# Patient Record
Sex: FEMALE | Race: WHITE | Employment: PART TIME | URBAN - METROPOLITAN AREA
[De-identification: names, ages, dates, MRNs, and addresses within clinical notes are randomized per-mention and may not be internally consistent; named-entity substitution may affect disease eponyms.]

---

## 2017-05-08 ENCOUNTER — THERAPY VISIT (OUTPATIENT)
Dept: PHYSICAL THERAPY | Facility: CLINIC | Age: 42
End: 2017-05-08
Payer: COMMERCIAL

## 2017-05-08 DIAGNOSIS — M25.551 HIP PAIN, RIGHT: ICD-10-CM

## 2017-05-08 DIAGNOSIS — M62.81 GENERALIZED MUSCLE WEAKNESS: ICD-10-CM

## 2017-05-08 DIAGNOSIS — N39.3 FEMALE STRESS INCONTINENCE: Primary | ICD-10-CM

## 2017-05-08 PROCEDURE — 97140 MANUAL THERAPY 1/> REGIONS: CPT | Mod: GP | Performed by: PHYSICAL THERAPIST

## 2017-05-08 PROCEDURE — 97535 SELF CARE MNGMENT TRAINING: CPT | Mod: GP | Performed by: PHYSICAL THERAPIST

## 2017-05-08 PROCEDURE — 97161 PT EVAL LOW COMPLEX 20 MIN: CPT | Mod: GP | Performed by: PHYSICAL THERAPIST

## 2017-05-08 PROCEDURE — 97110 THERAPEUTIC EXERCISES: CPT | Mod: GP | Performed by: PHYSICAL THERAPIST

## 2017-05-08 NOTE — MR AVS SNAPSHOT
After Visit Summary   5/8/2017    Adelina Houston    MRN: [id number]           Patient Information     Date Of Birth          1975        Visit Information        Provider Department      5/8/2017 2:10 PM Hilligoss, Amanda K, PT [facility]        Today's Diagnoses     Female stress incontinence    -  1    Hip pain, right           Follow-ups after your visit        Your next 10 appointments already scheduled     May 18, 2017 11:40 AM CDT   WILLIE For Women Only with Amanda K Hilligoss, PT   [facility] ([facility]  )    [address]  [facility] [address]   [phone]            May 25, 2017 11:40 AM CDT   WILLIE For Women Only with Amanda K Hilligoss, PT   [facility] ([facility]  )    [address]  [facility] [id number] [phone]              Who to contact     If you have questions or need follow up information about today's clinic visit or your schedule please contact [facility] directly at [phone].  Normal or non-critical lab and imaging results will be communicated to you by [employer] Talk[employer], letter or phone within 4 business days after the clinic has received the results. If you do not hear from us within 7 days, please contact the clinic through [employer] Talk[employer] or phone. If you have a critical or abnormal lab result, we will notify you by phone as soon as possible.  Submit refill requests through [employer] or call your pharmacy and they will forward the refill request to us. Please allow 3 business days for your refill to be completed.          Additional Information About Your Visit        [email or web address] Information     [employer] lets you send messages to your doctor, view your test results, renew your prescriptions, schedule appointments and more. To sign up, go to  [email or web address] . Click on \"Log in\" on the left side of the screen, which will take you to the Welcome page. Then click on \"Sign up Now\" on the right side of the page.     You will be asked to enter the access code listed below, as well as some personal information. Please follow the directions to create your username and password.     Your access code is: [email or web address]  Expires: 2017  4:01 PM     Your access code will  in 90 days. If you need help or a new code, please call your [facility] clinic or [phone].        Care EveryWhere ID     This is your Care EveryWhere ID. This could be used by other organizations to access your [facility] medical records  [id number]         Blood Pressure from Last 3 Encounters:   02 112/60    Weight from Last 3 Encounters:   02 56.6 kg (124 lb 12 oz)              We Performed the Following     HC PT EVAL, LOW COMPLEXITY     WILLIE INITIAL EVAL REPORT     MANUAL THER TECH,1+REGIONS,EA 15 MIN     SELF CARE MNGMENT TRAINING     THERAPEUTIC EXERCISES        Primary Care Provider Office Phone # Fax #    Ayana Knowles [phone]       XXX  XXX XXX  XXX [place] [address]        Thank you!     Thank you for choosing [facility]  for your care. Our goal is always to provide you with excellent care. Hearing back from our patients is one way we can continue to improve our services. Please take a few minutes to complete the written survey that you may receive in the mail after your visit with us. Thank you!             Your Updated Medication List - Protect others around you: Learn how to safely use, store and throw away your medicines at www.disposemymeds.org.          This list is accurate as of: 17  4:01 PM.  Always use your most recent med list.                   Brand Name Dispense Instructions for use    PRENATE ADVANCE Tabs     30    1 TABLET DAILY       ZYRTEC 10 MG tablet "   Generic drug:  cetirizine          1 TABLET DAILY

## 2017-05-08 NOTE — PROGRESS NOTES
[facility] Initial Evaluation  Subjective:  Adelina Houston is a 41 year old year old female with a pelvic floor condition. Patient reports onset of symptoms 2-3 years ago (after birth of 3rd child). Saw MD April 2017.Symptoms include stress incontinence. Since onset symptoms have been getting worse.  Urination:  Do you leak on the way to the bathroom or with a strong urge to void? No   Do you leak with cough,sneeze, jumping, running?Yes, jumping/ running   Any other activities that cause leaking? No   Do you have triggers that make you feel you can't wait to go to the bathroom? No   Type of pad and number used per day?  1 if doing higher level activities  When you leak what is the amount? small  How long can you delay the need to urinate? 1-2 hours.   How many times do you get up to urinate at night? 1   Can you stop the flow of urine when on the toilet? No (not tried)  Is the volume of urine passed usually: average. (8sec rule= 250ml with average bladder storing 400-600ml)  Do you strain to pass urine? No  Do you have a slow or hesitant urinary stream? No  Do you have difficulty initiating the urine stream? No  Is urination painful? No  How many bladder infections have you had in last 12 months?0  Fluid intake(one glass is 8oz or one cup) 2 glasses/day, 8 oz caffinated glasses/day  0 alcohol glasses/day.  Bowel habits:  Frequency of bowel movements? 1 times/day  Consistancy of stool? soft formed  Do you ignore the urge to defecate? No  Do you strain to pass stool? No  Pelvic Pain:  Do you have any pelvic pain with intercourse, exams, use of tampons? No  Is initial penetration during intercourse painful? No  Is deeper penetration painful? No  Do you use lubricant? No   ?  Given birth? Yes   Any complications? No  # of vaginal delieveries?3  # of C-sections?0  # of episiotomies?0.  Are you sexually active?Yes  Have you ever been worried for your physical safety? No  Any abdominal or pelvic  surgeries? No  Are you having any regular exercise? 30+ minutes daily  Have you practiced the PF(kegel) exercises for 4 or more weeks? No  Thyroid checked? No   (related to hair loss, flu-like symptoms, wt gain/loss, fatigue, menopause)  Changed diet lately? No  Patient is a 41 year old female presenting with rehab right hip hpi. The history is provided by the patient.   Adelina Houston is a 41 year old female with a right hip condition.  Condition occurred with:  Insidious onset.  Condition occurred: for unknown reasons.  This is a chronic condition  Jan 2017, started increasing frequency of exercising.    Patient reports pain:  Lateral.  Radiates to:  Low back.  Pain is described as aching and is intermittent and reported as 4/10.   Pain is the same all the time.  Symptoms are exacerbated by sitting and relieved by activity/movement.  Since onset symptoms are gradually improving.    Previous treatment includes chiropractic.  There was no improvement following previous treatment.  General health as reported by patient is good.    Medical allergies: no.  Other surgeries include:  None reported.  Current medications:  None as reported by the patient.  Current occupation is [employer].  Patient is working in normal job without restrictions.  Primary job tasks include:  Prolonged standing and lifting.    Barriers include:  None as reported by the patient.    Red flags:  None as reported by the patient.                        Objective:  Pt verbally agrees to internal assessment of pelvic floor muscles.  Baseline PF tone: WNL  PF Tone with cough: bulge  Valsalva: bulge  PF Response quality: moderate  PF Power: Center: 2  Endurance: Maximum contraction in seconds: 3  # of endurance contractions before fatigue: 10  Quick contraction repetitions prior to fatigue: Not tested.  Specificity/accessory muscles: initially uses glutes; uses adductors when cued not to use glutes  Prolapse: Cyctocele/Rectocele/Uterine grade:  0  Urethrocele: none, Enterocele: none.  PALPATION: Non-tender all superficial structures with digital evaluation  BIOFEEDBACK:  Tested in supine (improved resting tone compared to hooklying):Good initial contraction w/ quick fatigue. Good return to baseline. Consistent initial contraction for all 10 reps.    FUNCTIONAL QUESTIONNAIRES:  IIQ: 3/21 (Physical Recreation primary concern)  FAISAL: 1/18  AUA 1/35      Standing Alignment:        Lumbar:  Anterior pelvic tilt      Knee deviations alignment: Stands w/ knees locked.          Flexibility/Screens:       Lower Extremity:  Decreased left lower extremity flexibility:Piriformis    Decreased right lower extremity flexibility:  Piriformis and Hip Flexors               Lumbar/SI Evaluation  ROM:    AROM Lumbar:   Flexion:            WNL  Ext:                    WNL   Side Bend:        Left:  WNL    Right:  WNL  Rotation:           Left:  WNL    Right:  WNL  Side Glide:        Left:     Right:                             SI joint/Sacrum:    (+) Active SLR L  ASIS low on R/ high on L  (-) Torsion  Hip Adbd 5/5 B  General hypertonic R hip, no pain w/ palpation.    After MET:   (-) Active SLR  (-) ASIS level in supine                                                       General     ROS    Assessment/Plan:      Patient is a 41 year old female with pelvic and right side hip complaints.    Patient has the following significant findings with corresponding treatment plan.                Diagnosis 1:  Stress incontinence  Decreased strength - therapeutic exercise, therapeutic activities and home program  Impaired muscle performance - neuro re-education and home program  Decreased function - therapeutic activities and home program  Diagnosis 2:  R Hip pain   Pain -  hot/cold therapy, electric stimulation, manual therapy, self management, education and home program  Decreased ROM/flexibility - manual therapy, therapeutic exercise, therapeutic activity and home program  Decreased  joint mobility - manual therapy, therapeutic exercise, therapeutic activity and home program  Decreased strength - therapeutic exercise, therapeutic activities and home program  Impaired muscle performance - neuro re-education and home program  Impaired posture - neuro re-education, therapeutic activities and home program    Therapy Evaluation Codes:   1) History comprised of:   Personal factors that impact the plan of care:      Age, Gender and Profession.    Comorbidity factors that impact the plan of care are:      Bowel/bladder changes.     Medications impacting care: None.  2) Examination of Body Systems comprised of:   Body structures and functions that impact the plan of care:      Hip and Pelvis.   Activity limitations that impact the plan of care are:      Driving, Jumping, Sitting and Stress incontinence.  3) Clinical presentation characteristics are:   Stable/Uncomplicated.  4) Decision-Making    Low complexity using standardized patient assessment instrument and/or measureable assessment of functional outcome.  Cumulative Therapy Evaluation is: Low complexity.    Previous and current functional limitations:  (See Goal Flow Sheet for this information)    Short term and Long term goals: (See Goal Flow Sheet for this information)     Communication ability:  Patient appears to be able to clearly communicate and understand verbal and written communication and follow directions correctly.  Treatment Explanation - The following has been discussed with the patient:   RX ordered/plan of care  Anticipated outcomes  Possible risks and side effects  This patient would benefit from PT intervention to resume normal activities.   Rehab potential is good.    Frequency:  1 X week, once daily  Duration:  for 6 weeks  Discharge Plan:  Achieve all LTG.  Independent in home treatment program.  Reach maximal therapeutic benefit.    Please refer to the daily flowsheet for treatment today, total treatment time and time spent  performing 1:1 timed codes.

## 2017-05-08 NOTE — LETTER
[facility]  [address]  [facility] [id number] [phone]    May 9, 2017    Re: Adelina Houston   :   1975  MRN:  [id number]   REFERRING PHYSICIAN:   Nina Foote    [facility]  Date of Initial Evaluation:  17  Visits:  Rxs Used: 1  Reason for Referral:     Female stress incontinence  Hip pain, right    EVALUATION SUMMARY    [facility]  Subjective:  Adelina Houston is a 41 year old year old female with a pelvic floor condition. Patient reports onset of symptoms 2-3 years ago (after birth of 3rd child). Saw MD 2017.Symptoms include stress incontinence. Since onset symptoms have been getting worse.  Urination:  Do you leak on the way to the bathroom or with a strong urge to void? No   Do you leak with cough,sneeze, jumping, running?Yes, jumping/ running   Any other activities that cause leaking? No   Do you have triggers that make you feel you can't wait to go to the bathroom? No   Type of pad and number used per day?  1 if doing higher level activities  When you leak what is the amount? small  How long can you delay the need to urinate? 1-2 hours.   How many times do you get up to urinate at night? 1   Can you stop the flow of urine when on the toilet? No (not tried)  Is the volume of urine passed usually: average. (8sec rule= 250ml with average bladder storing 400-600ml)  Do you strain to pass urine? No  Do you have a slow or hesitant urinary stream? No  Do you have difficulty initiating the urine stream? No  Is urination painful? No  How many bladder infections have you had in last 12 months?0  Fluid intake(one glass is 8oz or one cup) 2 glasses/day, 8 oz caffinated glasses/day  0 alcohol glasses/day.    Bowel habits:  Frequency of bowel movements? 1 times/day  Consistancy of stool? soft formed  Do you ignore the urge to defecate? No  Do you  strain to pass stool? No  Pelvic Pain:  Do you have any pelvic pain with intercourse, exams, use of tampons? No  Is initial penetration during intercourse painful? No  Is deeper penetration painful? No  Do you use lubricant? No   ?  Given birth? Yes   Any complications? No  # of vaginal delieveries?3  # of C-sections?0  # of episiotomies?0.  Are you sexually active?Yes  Have you ever been worried for your physical safety? No  Any abdominal or pelvic surgeries? No  Are you having any regular exercise? 30+ minutes daily  Have you practiced the PF(kegel) exercises for 4 or more weeks? No  Thyroid checked? No   (related to hair loss, flu-like symptoms, wt gain/loss, fatigue, menopause)  Changed diet lately? No  Patient is a 41 year old female presenting with rehab right hip hpi. The history is provided by the patient.   Adelina Houston is a 41 year old female with a right hip condition.  Condition occurred with:  Insidious onset.  Condition occurred: for unknown reasons.  This is a chronic condition  Jan 2017, started increasing frequency of exercising.    Patient reports pain:  Lateral.  Radiates to:  Low back.  Pain is described as aching and is intermittent and reported as 4/10.   Pain is the same all the time.  Symptoms are exacerbated by sitting and relieved by activity/movement.  Since onset symptoms are gradually improving.    Previous treatment includes chiropractic.  There was no improvement following previous treatment.  General health as reported by patient is good.    Medical allergies: no.  Other surgeries include:  None reported.  Current medications:  None as reported by the patient.  Current occupation is [employer].  Patient is working in normal job without restrictions.  Primary job tasks include:  Prolonged standing and lifting.    Barriers include:  None as reported by the patient.  Red flags:  None as reported by the patient.        Objective:  Pt verbally agrees to internal assessment of  pelvic floor muscles.  Baseline PF tone: WNL  PF Tone with cough: bulge  Valsalva: bulge  PF Response quality: moderate  PF Power: Center: 2  Endurance: Maximum contraction in seconds: 3  # of endurance contractions before fatigue: 10  Quick contraction repetitions prior to fatigue: Not tested.  Specificity/accessory muscles: initially uses glutes; uses adductors when cued not to use glutes  Prolapse: Cyctocele/Rectocele/Uterine stgstrstastdstest:st st1st Urethrocele: none, Enterocele: none.  PALPATION: Non-tender all superficial structures with digital evaluation  BIOFEEDBACK:  Tested in supine (improved resting tone compared to hooklying):Good initial contraction w/ quick fatigue. Good return to baseline. Consistent initial contraction for all 10 reps.    FUNCTIONAL QUESTIONNAIRES:  IIQ: 3/21 (Physical Recreation primary concern)  FAISAL: 1/18  AUA 1/35    Standing Alignment:    Lumbar:  Anterior pelvic tilt  Knee deviations alignment: Stands w/ knees locked.    Flexibility/Screens:   Lower Extremity:  Decreased left lower extremity flexibility:Piriformis  Decreased right lower extremity flexibility:  Piriformis and Hip Flexors    Lumbar/SI Evaluation  ROM:    AROM Lumbar:   Flexion:            WNL  Ext:                    WNL   Side Bend:        Left:  WNL    Right:  WNL  Rotation:           Left:  WNL    Right:  WNL  Side Glide:        Left:     Right:        SI joint/Sacrum:    (+) Active SLR L  ASIS low on R/ high on L  (-) Torsion  Hip Adbd 5/5 B  General hypertonic R hip, no pain w/ palpation.  After MET:   (-) Active SLR  (-) ASIS level in supine                                Assessment/Plan:      Patient is a 41 year old female with pelvic and right side hip complaints.    Patient has the following significant findings with corresponding treatment plan.                Diagnosis 1:  Stress incontinence  Decreased strength - therapeutic exercise, therapeutic activities and home program  Impaired muscle performance - neuro  re-education and home program  Decreased function - therapeutic activities and home program  Diagnosis 2:  R Hip pain   Pain -  hot/cold therapy, electric stimulation, manual therapy, self management, education and home program  Decreased ROM/flexibility - manual therapy, therapeutic exercise, therapeutic activity and home program  Decreased joint mobility - manual therapy, therapeutic exercise, therapeutic activity and home program  Decreased strength - therapeutic exercise, therapeutic activities and home program  Impaired muscle performance - neuro re-education and home program  Impaired posture - neuro re-education, therapeutic activities and home program    Therapy Evaluation Codes:   1) History comprised of:   Personal factors that impact the plan of care:      Age, Gender and Profession.    Comorbidity factors that impact the plan of care are:      Bowel/bladder changes.     Medications impacting care: None.  2) Examination of Body Systems comprised of:   Body structures and functions that impact the plan of care:      Hip and Pelvis.   Activity limitations that impact the plan of care are:      Driving, Jumping, Sitting and Stress incontinence.  3) Clinical presentation characteristics are:   Stable/Uncomplicated.  4) Decision-Making    Low complexity using standardized patient assessment instrument and/or measureable assessment of functional outcome.  Cumulative Therapy Evaluation is: Low complexity.    Previous and current functional limitations:  (See Goal Flow Sheet for this information)    Short term and Long term goals: (See Goal Flow Sheet for this information)     Communication ability:  Patient appears to be able to clearly communicate and understand verbal and written communication and follow directions correctly.  Treatment Explanation - The following has been discussed with the patient:   RX ordered/plan of care  Anticipated outcomes  Possible risks and side effects  This patient would benefit  from PT intervention to resume normal activities.   Rehab potential is good.      Frequency:  1 X week, once daily  Duration:  for 6 weeks  Discharge Plan:  Achieve all LTG.  Independent in home treatment program.  Reach maximal therapeutic benefit.      Thank you for your referral.    INQUIRIES  Therapist: Amanda Hilligoss DPT   [facility]  [address] [place] [address] [address]  [facility] [address]  Phone: [phone]  Fax: [phone]

## 2017-05-11 PROBLEM — M62.81 GENERALIZED MUSCLE WEAKNESS: Status: ACTIVE | Noted: 2017-05-11

## 2017-05-18 ENCOUNTER — THERAPY VISIT (OUTPATIENT)
Dept: PHYSICAL THERAPY | Facility: CLINIC | Age: 42
End: 2017-05-18
Payer: COMMERCIAL

## 2017-05-18 DIAGNOSIS — N39.3 FEMALE STRESS INCONTINENCE: ICD-10-CM

## 2017-05-18 DIAGNOSIS — M62.81 GENERALIZED MUSCLE WEAKNESS: ICD-10-CM

## 2017-05-18 DIAGNOSIS — M25.551 HIP PAIN, RIGHT: ICD-10-CM

## 2017-05-18 PROCEDURE — 97112 NEUROMUSCULAR REEDUCATION: CPT | Mod: GP | Performed by: PHYSICAL THERAPIST

## 2017-05-18 PROCEDURE — 97110 THERAPEUTIC EXERCISES: CPT | Mod: GP | Performed by: PHYSICAL THERAPIST

## 2017-06-01 ENCOUNTER — THERAPY VISIT (OUTPATIENT)
Dept: PHYSICAL THERAPY | Facility: CLINIC | Age: 42
End: 2017-06-01
Payer: COMMERCIAL

## 2017-06-01 DIAGNOSIS — N39.3 FEMALE STRESS INCONTINENCE: ICD-10-CM

## 2017-06-01 DIAGNOSIS — M25.551 HIP PAIN, RIGHT: ICD-10-CM

## 2017-06-01 DIAGNOSIS — M62.81 GENERALIZED MUSCLE WEAKNESS: ICD-10-CM

## 2017-06-01 PROCEDURE — 97110 THERAPEUTIC EXERCISES: CPT | Mod: GP | Performed by: PHYSICAL THERAPIST

## 2017-06-01 PROCEDURE — 97112 NEUROMUSCULAR REEDUCATION: CPT | Mod: GP | Performed by: PHYSICAL THERAPIST

## 2017-06-01 NOTE — MR AVS SNAPSHOT
"              After Visit Summary   2017    Adelina Houston    MRN: [id number]           Patient Information     Date Of Birth          1975        Visit Information        Provider Department      2017 5:10 PM Hilligoss, Amanda K, PT [facility] [employer] [facility]        Today's Diagnoses     Generalized muscle weakness        Female stress incontinence        Hip pain, right           Follow-ups after your visit        Who to contact     If you have questions or need follow up information about today's clinic visit or your schedule please contact [facility] [employer] [facility] directly at [phone].  Normal or non-critical lab and imaging results will be communicated to you by [employer], letter or phone within 4 business days after the clinic has received the results. If you do not hear from us within 7 days, please contact the clinic through [employer] or phone. If you have a critical or abnormal lab result, we will notify you by phone as soon as possible.  Submit refill requests through [employer] or call your pharmacy and they will forward the refill request to us. Please allow 3 business days for your refill to be completed.          Additional Information About Your Visit        MyChart Information     [employer] lets you send messages to your doctor, view your test results, renew your prescriptions, schedule appointments and more. To sign up, go to [email or web address] . Click on \"Log in\" on the left side of the screen, which will take you to the Welcome page. Then click on \"Sign up Now\" on the right side of the page.     You will be asked to enter the access code listed below, as well as some personal information. Please follow the directions to create your username and password.     Your access code is: [email or web address]  Expires: 2017  4:01 PM     Your access code will  in 90 days. If you need help or a new code, please call your " [facility] or [phone].        Care EveryWhere ID     This is your Care EveryWhere ID. This could be used by other organizations to access your [place] medical records  [id number]         Blood Pressure from Last 3 Encounters:   02 112/60    Weight from Last 3 Encounters:   02 56.6 kg (124 lb 12 oz)              We Performed the Following     [facility] PROGRESS NOTES REPORT     NEUROMUSCULAR RE-EDUCATION     THERAPEUTIC EXERCISES        Primary Care Provider Office Phone # Fax Td aMrcosinna Lindy Knowles [phone]       XXX  XXX XXX  XXX [place] [address]        Thank you!     Thank you for choosing [facility]  for your care. Our goal is always to provide you with excellent care. Hearing back from our patients is one way we can continue to improve our services. Please take a few minutes to complete the written survey that you may receive in the mail after your visit with us. Thank you!             Your Updated Medication List - Protect others around you: Learn how to safely use, store and throw away your medicines at www.disposemymeds.org.          This list is accurate as of: 17  5:49 PM.  Always use your most recent med list.                   Brand Name Dispense Instructions for use    PRENATE ADVANCE Tabs     30    1 TABLET DAILY       ZYRTEC 10 MG tablet   Generic drug:  cetirizine          1 TABLET DAILY

## 2017-06-01 NOTE — LETTER
[facility]  [address]  [facility] [id number] [phone]    2017    Re: Adelina Houston   :   1975  MRN:  [id number]   REFERRING PHYSICIAN:   Nina Foote    [facility]  Date of Initial Evaluation:  17  Visits:  Rxs Used: 3  Reason for Referral:     Generalized muscle weakness  Female stress incontinence  Hip pain, right    DISCHARGE REPORT  Progress reporting period is from 17 to 17.       SUBJECTIVE  Pt notes she no longer has pain in R hip, starting about a week ago. Has not had any episodes of incontinence over past week, even with jumping. Feels she is ready to continue progressing independently w/ HEP.    Current Pain level: 0/10.     Initial Pain level: 4/10.   Changes in function:  Yes (See Goal flowsheet attached for changes in current functional level)  Adverse reaction to treatment or activity: None    OBJECTIVE  Continues to stand w/ increased lumbar lordosis; (+) Hai test B, (-) Gillet test, (-) Active SLR, (-) ROLAND B; Pt deferred internal pelvic floor muscle assessment due to 0 episodes of incontinence over past week; Plank Advanced: 35 sec; IIQ  (initially 3/21)     ASSESSMENT/PLAN  Updated problem list and treatment plan: Diagnosis 1:  Stress incontinence  Decreased strength - home program  Impaired muscle performance - home program  Decreased function - home program  Diagnosis 2:  R Hip pain   Pain -  self management and home program  Decreased ROM/flexibility - home program  Decreased joint mobility - home program  Decreased strength - home program  Impaired muscle performance - home program  Impaired posture - home program    STG/LTGs have been met or progress has been made towards goals:  Yes (See Goal flow sheet completed today.)  Assessment of Progress: The patient has met all of their long term goals.  Self Management Plans:  Patient is  independent in a home treatment program.  Patient is independent in self management of symptoms.  I have re-evaluated this patient and find that PT intervention is no longer required to meet STG/LTG.    Recommendations:  This patient is ready to be discharged from therapy and continue their home treatment program.          Thank you for your referral.    INQUIRIES  Therapist: Amanda Hilligoss DPT  [facility]  [address] [place] [address]. [id number]  [facility] [address]  Phone: [phone]  Fax: [phone]

## 2017-06-01 NOTE — PROGRESS NOTES
Subjective:    HPI                    Objective:    System    Physical Exam    General     ROS    Assessment/Plan:      DISCHARGE REPORT    Progress reporting period is from 5/8/17 to 6/1/17.       SUBJECTIVE  Pt notes she no longer has pain in R hip, starting about a week ago. Has not had any episodes of incontinence over past week, even with jumping. Feels she is ready to continue progressing independently w/ HEP.    Current Pain level: 0/10.     Initial Pain level: 4/10.   Changes in function:  Yes (See Goal flowsheet attached for changes in current functional level)  Adverse reaction to treatment or activity: None    OBJECTIVE  Continues to stand w/ increased lumbar lordosis; (+) Hai test B, (-) Gillet test, (-) Active SLR, (-) ROLAND B; Pt deferred internal pelvic floor muscle assessment due to 0 episodes of incontinence over past week; Plank Advanced: 35 sec; IIQ 1/21 (initially 3/21)     ASSESSMENT/PLAN  Updated problem list and treatment plan: Diagnosis 1:  Stress incontinence  Decreased strength - home program  Impaired muscle performance - home program  Decreased function - home program  Diagnosis 2:  R Hip pain   Pain -  self management and home program  Decreased ROM/flexibility - home program  Decreased joint mobility - home program  Decreased strength - home program  Impaired muscle performance - home program  Impaired posture - home program  STG/LTGs have been met or progress has been made towards goals:  Yes (See Goal flow sheet completed today.)  Assessment of Progress: The patient has met all of their long term goals.  Self Management Plans:  Patient is independent in a home treatment program.  Patient is independent in self management of symptoms.  I have re-evaluated this patient and find that PT intervention is no longer required to meet STG/LTG.    Recommendations:  This patient is ready to be discharged from therapy and continue their home treatment program.    Please refer to the daily  flowsheet for treatment today, total treatment time and time spent performing 1:1 timed codes.

## 2017-07-22 ENCOUNTER — HEALTH MAINTENANCE LETTER (OUTPATIENT)
Age: 42
End: 2017-07-22

## 2018-07-13 PROBLEM — M62.81 GENERALIZED MUSCLE WEAKNESS: Status: RESOLVED | Noted: 2017-05-11 | Resolved: 2018-07-13

## 2018-07-13 PROBLEM — M25.551 HIP PAIN, RIGHT: Status: RESOLVED | Noted: 2017-05-08 | Resolved: 2018-07-13

## 2018-07-13 PROBLEM — N39.3 FEMALE STRESS INCONTINENCE: Status: RESOLVED | Noted: 2017-05-08 | Resolved: 2018-07-13
